# Patient Record
Sex: FEMALE | Race: WHITE | Employment: FULL TIME | URBAN - METROPOLITAN AREA
[De-identification: names, ages, dates, MRNs, and addresses within clinical notes are randomized per-mention and may not be internally consistent; named-entity substitution may affect disease eponyms.]

---

## 2021-05-03 ENCOUNTER — HOSPITAL ENCOUNTER (EMERGENCY)
Age: 41
Discharge: HOME | End: 2021-05-04
Attending: EMERGENCY MEDICINE
Payer: COMMERCIAL

## 2021-05-03 DIAGNOSIS — R21 RASH AND OTHER NONSPECIFIC SKIN ERUPTION: Primary | ICD-10-CM

## 2021-05-03 DIAGNOSIS — R50.9 INTERMITTENT FEVER OF UNKNOWN ORIGIN: ICD-10-CM

## 2021-05-03 PROCEDURE — 99283 EMERGENCY DEPT VISIT LOW MDM: CPT

## 2021-05-03 PROCEDURE — 96374 THER/PROPH/DIAG INJ IV PUSH: CPT

## 2021-05-04 VITALS
TEMPERATURE: 98.3 F | DIASTOLIC BLOOD PRESSURE: 58 MMHG | HEART RATE: 102 BPM | RESPIRATION RATE: 16 BRPM | SYSTOLIC BLOOD PRESSURE: 121 MMHG | OXYGEN SATURATION: 100 %

## 2021-05-04 LAB
A/G RATIO: 1.3 (ref 1.1–2.2)
ALBUMIN SERPL-MCNC: 3.8 G/DL (ref 3.4–5)
ALP BLD-CCNC: 71 U/L (ref 40–129)
ALT SERPL-CCNC: 18 U/L (ref 10–40)
ANION GAP SERPL CALCULATED.3IONS-SCNC: 10 MMOL/L (ref 3–16)
AST SERPL-CCNC: 17 U/L (ref 15–37)
BASOPHILS ABSOLUTE: 0.1 K/UL (ref 0–0.2)
BASOPHILS RELATIVE PERCENT: 0.7 %
BILIRUB SERPL-MCNC: 0.4 MG/DL (ref 0–1)
BILIRUBIN URINE: NEGATIVE
BLOOD, URINE: NEGATIVE
BUN BLDV-MCNC: 9 MG/DL (ref 7–20)
CALCIUM SERPL-MCNC: 9.1 MG/DL (ref 8.3–10.6)
CHLORIDE BLD-SCNC: 102 MMOL/L (ref 99–110)
CLARITY: CLEAR
CO2: 26 MMOL/L (ref 21–32)
COLOR: YELLOW
CREAT SERPL-MCNC: 0.6 MG/DL (ref 0.6–1.1)
EOSINOPHILS ABSOLUTE: 0.2 K/UL (ref 0–0.6)
EOSINOPHILS RELATIVE PERCENT: 2 %
GFR AFRICAN AMERICAN: >60
GFR NON-AFRICAN AMERICAN: >60
GLOBULIN: 2.9 G/DL
GLUCOSE BLD-MCNC: 100 MG/DL (ref 70–99)
GLUCOSE URINE: NEGATIVE MG/DL
HCG QUALITATIVE: NEGATIVE
HCT VFR BLD CALC: 37.6 % (ref 36–48)
HEMOGLOBIN: 12.8 G/DL (ref 12–16)
KETONES, URINE: NEGATIVE MG/DL
LEUKOCYTE ESTERASE, URINE: NEGATIVE
LYMPHOCYTES ABSOLUTE: 1.7 K/UL (ref 1–5.1)
LYMPHOCYTES RELATIVE PERCENT: 16.2 %
MCH RBC QN AUTO: 33.8 PG (ref 26–34)
MCHC RBC AUTO-ENTMCNC: 34.1 G/DL (ref 31–36)
MCV RBC AUTO: 99.3 FL (ref 80–100)
MICROSCOPIC EXAMINATION: NORMAL
MONOCYTES ABSOLUTE: 0.5 K/UL (ref 0–1.3)
MONOCYTES RELATIVE PERCENT: 4.6 %
NEUTROPHILS ABSOLUTE: 7.9 K/UL (ref 1.7–7.7)
NEUTROPHILS RELATIVE PERCENT: 76.5 %
NITRITE, URINE: NEGATIVE
PDW BLD-RTO: 13.4 % (ref 12.4–15.4)
PH UA: 6.5 (ref 5–8)
PLATELET # BLD: 362 K/UL (ref 135–450)
PMV BLD AUTO: 6.9 FL (ref 5–10.5)
POTASSIUM REFLEX MAGNESIUM: 3.7 MMOL/L (ref 3.5–5.1)
PROTEIN UA: NEGATIVE MG/DL
RAPID HIV 1&2: NORMAL
RBC # BLD: 3.78 M/UL (ref 4–5.2)
SARS-COV-2, NAAT: NOT DETECTED
SODIUM BLD-SCNC: 138 MMOL/L (ref 136–145)
SPECIFIC GRAVITY UA: <=1.005 (ref 1–1.03)
TOTAL PROTEIN: 6.7 G/DL (ref 6.4–8.2)
URINE REFLEX TO CULTURE: NORMAL
URINE TYPE: NORMAL
UROBILINOGEN, URINE: 0.2 E.U./DL
WBC # BLD: 10.3 K/UL (ref 4–11)

## 2021-05-04 PROCEDURE — 80053 COMPREHEN METABOLIC PANEL: CPT

## 2021-05-04 PROCEDURE — 84703 CHORIONIC GONADOTROPIN ASSAY: CPT

## 2021-05-04 PROCEDURE — 86701 HIV-1ANTIBODY: CPT

## 2021-05-04 PROCEDURE — 87635 SARS-COV-2 COVID-19 AMP PRB: CPT

## 2021-05-04 PROCEDURE — 6360000002 HC RX W HCPCS: Performed by: EMERGENCY MEDICINE

## 2021-05-04 PROCEDURE — 2580000003 HC RX 258: Performed by: EMERGENCY MEDICINE

## 2021-05-04 PROCEDURE — 85025 COMPLETE CBC W/AUTO DIFF WBC: CPT

## 2021-05-04 PROCEDURE — 81003 URINALYSIS AUTO W/O SCOPE: CPT

## 2021-05-04 RX ORDER — PREDNISONE 10 MG/1
40 TABLET ORAL DAILY
Qty: 20 TABLET | Refills: 0 | Status: SHIPPED | OUTPATIENT
Start: 2021-05-04 | End: 2021-05-09

## 2021-05-04 RX ORDER — 0.9 % SODIUM CHLORIDE 0.9 %
1000 INTRAVENOUS SOLUTION INTRAVENOUS ONCE
Status: COMPLETED | OUTPATIENT
Start: 2021-05-04 | End: 2021-05-04

## 2021-05-04 RX ORDER — METHYLPREDNISOLONE SODIUM SUCCINATE 125 MG/2ML
80 INJECTION, POWDER, LYOPHILIZED, FOR SOLUTION INTRAMUSCULAR; INTRAVENOUS ONCE
Status: COMPLETED | OUTPATIENT
Start: 2021-05-04 | End: 2021-05-04

## 2021-05-04 RX ADMIN — SODIUM CHLORIDE 1000 ML: 9 INJECTION, SOLUTION INTRAVENOUS at 03:02

## 2021-05-04 RX ADMIN — METHYLPREDNISOLONE SODIUM SUCCINATE 80 MG: 125 INJECTION, POWDER, FOR SOLUTION INTRAMUSCULAR; INTRAVENOUS at 03:02

## 2021-05-04 NOTE — ED TRIAGE NOTES
Pt. C/o fever ongoing everyday on and off since April 4th. Pt. States with fever comes rash. Temperature is 98.5.

## 2021-05-06 NOTE — ED PROVIDER NOTES
 Smokeless tobacco: Never Used   Substance and Sexual Activity    Alcohol use: No    Drug use: No    Sexual activity: Not Currently   Lifestyle    Physical activity     Days per week: Not on file     Minutes per session: Not on file    Stress: Not on file   Relationships    Social connections     Talks on phone: Not on file     Gets together: Not on file     Attends Shinto service: Not on file     Active member of club or organization: Not on file     Attends meetings of clubs or organizations: Not on file     Relationship status: Not on file    Intimate partner violence     Fear of current or ex partner: Not on file     Emotionally abused: Not on file     Physically abused: Not on file     Forced sexual activity: Not on file   Other Topics Concern    Not on file   Social History Narrative    Not on file     No current facility-administered medications for this encounter. Current Outpatient Medications   Medication Sig Dispense Refill    predniSONE (DELTASONE) 10 MG tablet Take 4 tablets by mouth daily for 5 days 20 tablet 0     No Known Allergies    REVIEW OF SYSTEMS  10 systems reviewed, pertinent positives per HPI otherwise noted to be negative. PHYSICAL EXAM  BP (!) 121/58   Pulse 102   Temp 98.3 °F (36.8 °C) (Oral)   Resp 16   LMP 04/19/2021 (Approximate)   SpO2 100%   Breastfeeding Unknown    Physical exam:  General appearance: awake and cooperative. No distress. Non toxic appearing. Skin: Warm and dry. There are erythematous regions of skin, no central clearing, no induration or streaking to neck, chest, back, and extremities; palms, soles and face are spared. No urticaria. HENT: Normocephalic. Atraumatic. Mucus membranes are moist.  No oral lesions. No oropharyngeal edema. No erythema or exudate  Neck: supple  Eyes: KIRSTY. EOM intact. Heart: RRR. No murmurs. Lungs: Respirations unlabored. CTAB. No wheezes, rales, or rhonchi. Good air exchange  Abdomen: No tenderness. Soft. Non distended. No peritoneal signs. Musculoskeletal: No extremity edema. Compartments soft. No deformity. No tenderness in the extremities. All extremities neurovascularly intact. Radial, Dp, and PT pulses +2/4 bilaterally  Neurological: Alert and oriented. No focal deficits. No aphasia or dysarthria. No gait ataxia. Psychiatric: Normal mood and affect. LABS  I have reviewed all labs for this visit.    Results for orders placed or performed during the hospital encounter of 05/03/21   COVID-19, Rapid   Result Value Ref Range    SARS-CoV-2, NAAT Not Detected Not Detected   Urinalysis Reflex to Culture    Specimen: Urine, clean catch   Result Value Ref Range    Color, UA Yellow Straw/Yellow    Clarity, UA Clear Clear    Glucose, Ur Negative Negative mg/dL    Bilirubin Urine Negative Negative    Ketones, Urine Negative Negative mg/dL    Specific Gravity, UA <=1.005 1.005 - 1.030    Blood, Urine Negative Negative    pH, UA 6.5 5.0 - 8.0    Protein, UA Negative Negative mg/dL    Urobilinogen, Urine 0.2 <2.0 E.U./dL    Nitrite, Urine Negative Negative    Leukocyte Esterase, Urine Negative Negative    Microscopic Examination Not Indicated     Urine Type NotGiven     Urine Reflex to Culture Not Indicated    CBC Auto Differential   Result Value Ref Range    WBC 10.3 4.0 - 11.0 K/uL    RBC 3.78 (L) 4.00 - 5.20 M/uL    Hemoglobin 12.8 12.0 - 16.0 g/dL    Hematocrit 37.6 36.0 - 48.0 %    MCV 99.3 80.0 - 100.0 fL    MCH 33.8 26.0 - 34.0 pg    MCHC 34.1 31.0 - 36.0 g/dL    RDW 13.4 12.4 - 15.4 %    Platelets 247 891 - 229 K/uL    MPV 6.9 5.0 - 10.5 fL    Neutrophils % 76.5 %    Lymphocytes % 16.2 %    Monocytes % 4.6 %    Eosinophils % 2.0 %    Basophils % 0.7 %    Neutrophils Absolute 7.9 (H) 1.7 - 7.7 K/uL    Lymphocytes Absolute 1.7 1.0 - 5.1 K/uL    Monocytes Absolute 0.5 0.0 - 1.3 K/uL    Eosinophils Absolute 0.2 0.0 - 0.6 K/uL    Basophils Absolute 0.1 0.0 - 0.2 K/uL   Comprehensive Metabolic Panel w/ Reflex Patient is given dose of Solu-Medrol here and fluids, she describes improvement in the rash. She is given a short course of prednisone for home. The etiology of the rash is not entirely clear but I do not suspect SJS, TN, or erythema to form. There is no evidence that this is allergic in nature, no sign of anaphylaxis. I do not suspect toxic shock syndrome or staphylococcal scalded skin syndrome. Overall the patient is well-appearing and I can follow-up outpatient. She is given referrals for primary care, dermatology, and infectious disease since she reports intermittent fevers. We did discuss strict return precautions at length and she voices understanding. During the patient's ED course, the patient was given:  Medications   0.9 % sodium chloride bolus (0 mLs Intravenous Stopped 5/4/21 0402)   methylPREDNISolone sodium (SOLU-MEDROL) injection 80 mg (80 mg Intravenous Given 5/4/21 0302)        CLINICAL IMPRESSION  1. Rash and other nonspecific skin eruption    2. Intermittent fever of unknown origin        Blood pressure (!) 121/58, pulse 102, temperature 98.3 °F (36.8 °C), temperature source Oral, resp. rate 16, last menstrual period 04/19/2021, SpO2 100 %, unknown if currently breastfeeding. Patient was given scripts for the following medications. I counseled patient how to take these medications. Discharge Medication List as of 5/4/2021  4:16 AM      START taking these medications    Details   predniSONE (DELTASONE) 10 MG tablet Take 4 tablets by mouth daily for 5 days, Disp-20 tablet, R-0Print             Follow-up with:  Wu Simons  [phone]          DISCLAIMER: This chart was created using Dragon dictation software. Efforts were made by me to ensure accuracy, however some errors may be present due to limitations of this technology and occasionally words are not transcribed correctly.        Cruz Moreau, [address] [place] [address]  05/05/21 0354

## 2021-05-25 ENCOUNTER — OFFICE VISIT (OUTPATIENT)
Dept: INFECTIOUS DISEASES | Age: 41
End: 2021-05-25
Payer: COMMERCIAL

## 2021-05-25 VITALS
SYSTOLIC BLOOD PRESSURE: 138 MMHG | BODY MASS INDEX: 26.82 KG/M2 | HEIGHT: 65 IN | TEMPERATURE: 98.4 F | DIASTOLIC BLOOD PRESSURE: 88 MMHG | WEIGHT: 161 LBS

## 2021-05-25 DIAGNOSIS — R50.9 FUO (FEVER OF UNKNOWN ORIGIN): Primary | ICD-10-CM

## 2021-05-25 PROCEDURE — 99243 OFF/OP CNSLTJ NEW/EST LOW 30: CPT | Performed by: INTERNAL MEDICINE

## 2021-05-25 NOTE — PROGRESS NOTES
Infectious Diseases   Consult Note      Reason for Consult: rash, fever    Requesting Physician:       Subjective:   CHIEF COMPLAINT:  None given       HPI:    Ethan Wagoner is a 47yoF with limited medical history                 Day after Easter, began feeling weak, high grade daily usually in the morning, myalgia  No arthralgia   Anorexia. Maybe 4# down  \"Mild\" sore throat for ~2 weeks     Rash flat, large patches of erythema, not pruritic or painful  Rash most prominent /intensely red with fever, tend to resolve with fever resolved     ED 5/3/21 -   COVID NAAT neg   UA neg nitrite/LE  WBC 10.3 with very slight neutrophilia   CMP nl   HIV screen neg     Rx then solumedrol with prescription for prednisone   Since then, feeling well. Current abx:  None    Only regular med is OTC claritin        Past Surgical History:   No past medical history on file. No past surgical history on file. Social History:    TOBACCO:   reports that she has been smoking cigarettes. She has been smoking about 0.50 packs per day. She has never used smokeless tobacco.  ETOH:   reports no history of alcohol use. There is no history of illicit drug use or other significant epidemiologic exposures. Born [address] and lived locally  Travel to [place] in [address]  No international travel   Works at [employer] w sister and  and 2 kids and 4 dogs, [place]   [address]   Not sexually active       Family History:   DM  Dad PCV , ? Agent orange related   Mom and aunt have RA       No Known Allergies       REVIEW OF SYSTEMS:    CONSTITUTIONAL:  negative for fevers, chills, diaphoresis, activity change, appetite change, fatigue, night sweats and unexpected weight change.    EYES:  negative for blurred vision, eye discharge, visual disturbance and icterus  HEENT:  negative for hearing loss, tinnitus, ear drainage, sinus pressure, nasal congestion, epistaxis and snoring  RESPIRATORY:  No cough, shortness of breath, hemoptysis CARDIOVASCULAR:  negative for chest pain, palpitations, exertional chest pressure/discomfort, edema, syncope  GASTROINTESTINAL:  negative for nausea, vomiting, diarrhea, constipation, blood in stool and abdominal pain  GENITOURINARY:   Nl menses, no  symptoms  HEMATOLOGIC/LYMPHATIC:  negative for easy bruising, bleeding and lymphadenopathy  ALLERGIC/IMMUNOLOGIC:  negative for recurrent infections, angioedema, anaphylaxis and drug reactions  ENDOCRINE:  negative for weight changes and diabetic symptoms including polyuria, polydipsia and polyphagia  MUSCULOSKELETAL:   Hands cramp   NEUROLOGICAL:  negative for headaches, slurred speech, unilateral weakness  PSYCHIATRIC/BEHAVIORAL: negative for hallucinations, behavioral problems, confusion and agitation. Objective:   PHYSICAL EXAM:      VITALS:  There were no vitals taken for this visit. 24HR INTAKE/OUTPUT:  No intake or output data in the 24 hours ending 05/25/21 1144  CONSTITUTIONAL:  Awake, alert, cooperative, no apparent distress, and appears stated age  [de-identified]: NCAT, PERRL, EOMI. Sclera white, conjunctiva full. OP with moist mucosal membranes, no thrush, tongue protrudes midline  NECK:  Supple, symmetrical, trachea midline, no adenopathy  LUNGS:  no increased work of breathing, CTA angie   CARDIOVASCULAR:  RRR without murmur   ABDOMEN:  normal bowel sounds, non-tender, non-distended  PSYCHIATRIC: Oriented to person place and time. No obvious depression or anxiety. MUSCULOSKELETAL: No obvious misalignment or effusion of the joints. No clubbing, cyanosis of the digits. SKIN:  normal skin color, texture, turgor and no redness, warmth, or swelling.  No palpable nodules or stigmata of embolic phenomenon  NEUROLOGIC: nonfocal exam        DATA:    Old records have been reviewed      Assessment:       Wesly Veras is a 47yoF who is evaluated for the following:    Febrile illness  Migratory macular rash, some with central pallor  Arthralgia   Seems to be steroid responsive syndrome     NKDA       Ddx broad  Inflammatory etiology favored over infectious given response to steroid  She has strong FH of autoimmune disease    Check the following  ASO   RF   ESR, CRP   ALVIN  CK   Lyme     Phone follow-up with those results and if symptoms recur  All questions answered        Sameer Leonard M.D. Thank you for the opportunity to participate in the care of your patient.     Please do not hesitate to contact me:   [phone] office

## 2024-09-24 ENCOUNTER — HOSPITAL ENCOUNTER (INPATIENT)
Age: 44
LOS: 1 days | Discharge: HOME | DRG: 951 | End: 2024-09-25
Attending: EMERGENCY MEDICINE | Admitting: INTERNAL MEDICINE
Payer: COMMERCIAL

## 2024-09-24 ENCOUNTER — APPOINTMENT (OUTPATIENT)
Dept: ULTRASOUND IMAGING | Age: 44
DRG: 951 | End: 2024-09-24
Payer: COMMERCIAL

## 2024-09-24 DIAGNOSIS — R55 SYNCOPE AND COLLAPSE: ICD-10-CM

## 2024-09-24 DIAGNOSIS — O03.9 MISCARRIAGE: Primary | ICD-10-CM

## 2024-09-24 DIAGNOSIS — R82.90 ABNORMAL URINALYSIS: ICD-10-CM

## 2024-09-24 PROBLEM — N93.9 VAGINAL BLEEDING: Status: ACTIVE | Noted: 2024-09-24

## 2024-09-24 LAB
ABO + RH BLD: NORMAL
ALBUMIN SERPL-MCNC: 3.8 G/DL (ref 3.4–5)
ALBUMIN/GLOB SERPL: 1.7 {RATIO} (ref 1.1–2.2)
ALP SERPL-CCNC: 60 U/L (ref 40–129)
ALT SERPL-CCNC: 8 U/L (ref 10–40)
ANION GAP SERPL CALCULATED.3IONS-SCNC: 13 MMOL/L (ref 3–16)
AST SERPL-CCNC: 13 U/L (ref 15–37)
B-HCG SERPL EIA 3RD IS-ACNC: 1106 MIU/ML
BASOPHILS # BLD: 0.1 K/UL (ref 0–0.2)
BASOPHILS NFR BLD: 0.4 %
BILIRUB SERPL-MCNC: 0.3 MG/DL (ref 0–1)
BILIRUB UR QL STRIP.AUTO: NEGATIVE
BUN SERPL-MCNC: 14 MG/DL (ref 7–20)
CALCIUM SERPL-MCNC: 8.6 MG/DL (ref 8.3–10.6)
CHLORIDE SERPL-SCNC: 102 MMOL/L (ref 99–110)
CLARITY UR: ABNORMAL
CO2 SERPL-SCNC: 22 MMOL/L (ref 21–32)
COLOR UR: ABNORMAL
CREAT SERPL-MCNC: 0.7 MG/DL (ref 0.6–1.1)
DEPRECATED RDW RBC AUTO: 13 % (ref 12.4–15.4)
EOSINOPHIL # BLD: 0.1 K/UL (ref 0–0.6)
EOSINOPHIL NFR BLD: 0.4 %
GFR SERPLBLD CREATININE-BSD FMLA CKD-EPI: >90 ML/MIN/{1.73_M2}
GLUCOSE SERPL-MCNC: 154 MG/DL (ref 70–99)
GLUCOSE UR STRIP.AUTO-MCNC: NEGATIVE MG/DL
HCG SERPL QL: POSITIVE
HCT VFR BLD AUTO: 32.6 % (ref 36–48)
HCT VFR BLD AUTO: 33.3 % (ref 36–48)
HCT VFR BLD AUTO: 36.1 % (ref 36–48)
HGB BLD-MCNC: 10.7 G/DL (ref 12–16)
HGB BLD-MCNC: 11 G/DL (ref 12–16)
HGB BLD-MCNC: 12.1 G/DL (ref 12–16)
HGB UR QL STRIP.AUTO: ABNORMAL
KETONES UR STRIP.AUTO-MCNC: 15 MG/DL
LACTATE BLDV-SCNC: 1.2 MMOL/L (ref 0.4–2)
LEUKOCYTE ESTERASE UR QL STRIP.AUTO: ABNORMAL
LYMPHOCYTES # BLD: 2.4 K/UL (ref 1–5.1)
LYMPHOCYTES NFR BLD: 13.8 %
MCH RBC QN AUTO: 33.7 PG (ref 26–34)
MCHC RBC AUTO-ENTMCNC: 33.5 G/DL (ref 31–36)
MCV RBC AUTO: 100.4 FL (ref 80–100)
MONOCYTES # BLD: 0.8 K/UL (ref 0–1.3)
MONOCYTES NFR BLD: 4.4 %
NEUTROPHILS # BLD: 14.3 K/UL (ref 1.7–7.7)
NEUTROPHILS NFR BLD: 81 %
NITRITE UR QL STRIP.AUTO: POSITIVE
PH UR STRIP.AUTO: 5.5 [PH] (ref 5–8)
PLATELET # BLD AUTO: 336 K/UL (ref 135–450)
PMV BLD AUTO: 7.3 FL (ref 5–10.5)
POTASSIUM SERPL-SCNC: 3.7 MMOL/L (ref 3.5–5.1)
PROCALCITONIN SERPL IA-MCNC: 0.04 NG/ML (ref 0–0.15)
PROT SERPL-MCNC: 6 G/DL (ref 6.4–8.2)
PROT UR STRIP.AUTO-MCNC: 100 MG/DL
RBC # BLD AUTO: 3.6 M/UL (ref 4–5.2)
RBC #/AREA URNS HPF: >100 /HPF (ref 0–4)
RHIG LOT NUMBER: NORMAL
SODIUM SERPL-SCNC: 137 MMOL/L (ref 136–145)
SP GR UR STRIP.AUTO: 1.02 (ref 1–1.03)
UA COMPLETE W REFLEX CULTURE PNL UR: ABNORMAL
UA DIPSTICK W REFLEX MICRO PNL UR: YES
URN SPEC COLLECT METH UR: ABNORMAL
UROBILINOGEN UR STRIP-ACNC: 1 E.U./DL
WBC # BLD AUTO: 17.7 K/UL (ref 4–11)
WBC #/AREA URNS HPF: ABNORMAL /HPF (ref 0–5)

## 2024-09-24 PROCEDURE — 84145 PROCALCITONIN (PCT): CPT

## 2024-09-24 PROCEDURE — 6360000002 HC RX W HCPCS: Performed by: EMERGENCY MEDICINE

## 2024-09-24 PROCEDURE — 85014 HEMATOCRIT: CPT

## 2024-09-24 PROCEDURE — 36415 COLL VENOUS BLD VENIPUNCTURE: CPT

## 2024-09-24 PROCEDURE — 1200000000 HC SEMI PRIVATE

## 2024-09-24 PROCEDURE — 85018 HEMOGLOBIN: CPT

## 2024-09-24 PROCEDURE — 99285 EMERGENCY DEPT VISIT HI MDM: CPT

## 2024-09-24 PROCEDURE — 6370000000 HC RX 637 (ALT 250 FOR IP): Performed by: INTERNAL MEDICINE

## 2024-09-24 PROCEDURE — 83605 ASSAY OF LACTIC ACID: CPT

## 2024-09-24 PROCEDURE — 84702 CHORIONIC GONADOTROPIN TEST: CPT

## 2024-09-24 PROCEDURE — 81001 URINALYSIS AUTO W/SCOPE: CPT

## 2024-09-24 PROCEDURE — 86901 BLOOD TYPING SEROLOGIC RH(D): CPT

## 2024-09-24 PROCEDURE — 96372 THER/PROPH/DIAG INJ SC/IM: CPT

## 2024-09-24 PROCEDURE — 85025 COMPLETE CBC W/AUTO DIFF WBC: CPT

## 2024-09-24 PROCEDURE — 84703 CHORIONIC GONADOTROPIN ASSAY: CPT

## 2024-09-24 PROCEDURE — 76857 US EXAM PELVIC LIMITED: CPT

## 2024-09-24 PROCEDURE — 80053 COMPREHEN METABOLIC PANEL: CPT

## 2024-09-24 PROCEDURE — 2580000003 HC RX 258: Performed by: INTERNAL MEDICINE

## 2024-09-24 PROCEDURE — 93005 ELECTROCARDIOGRAM TRACING: CPT | Performed by: INTERNAL MEDICINE

## 2024-09-24 PROCEDURE — 86900 BLOOD TYPING SEROLOGIC ABO: CPT

## 2024-09-24 RX ORDER — SODIUM CHLORIDE 9 MG/ML
INJECTION, SOLUTION INTRAVENOUS PRN
Status: DISCONTINUED | OUTPATIENT
Start: 2024-09-24 | End: 2024-09-25 | Stop reason: HOSPADM

## 2024-09-24 RX ORDER — CEFUROXIME AXETIL 500 MG/1
500 TABLET ORAL 2 TIMES DAILY
Qty: 14 TABLET | Refills: 0 | Status: SHIPPED | OUTPATIENT
Start: 2024-09-24 | End: 2024-10-01

## 2024-09-24 RX ORDER — SODIUM CHLORIDE 9 MG/ML
INJECTION, SOLUTION INTRAVENOUS CONTINUOUS
Status: ACTIVE | OUTPATIENT
Start: 2024-09-24 | End: 2024-09-25

## 2024-09-24 RX ORDER — LANOLIN ALCOHOL/MO/W.PET/CERES
3 CREAM (GRAM) TOPICAL NIGHTLY PRN
Status: DISCONTINUED | OUTPATIENT
Start: 2024-09-24 | End: 2024-09-25 | Stop reason: HOSPADM

## 2024-09-24 RX ORDER — ONDANSETRON 2 MG/ML
4 INJECTION INTRAMUSCULAR; INTRAVENOUS EVERY 6 HOURS PRN
Status: DISCONTINUED | OUTPATIENT
Start: 2024-09-24 | End: 2024-09-25 | Stop reason: HOSPADM

## 2024-09-24 RX ORDER — PROMETHAZINE HYDROCHLORIDE 25 MG/1
12.5 TABLET ORAL EVERY 6 HOURS PRN
Status: DISCONTINUED | OUTPATIENT
Start: 2024-09-24 | End: 2024-09-25 | Stop reason: HOSPADM

## 2024-09-24 RX ORDER — SODIUM CHLORIDE 0.9 % (FLUSH) 0.9 %
10 SYRINGE (ML) INJECTION PRN
Status: DISCONTINUED | OUTPATIENT
Start: 2024-09-24 | End: 2024-09-25 | Stop reason: HOSPADM

## 2024-09-24 RX ORDER — SODIUM CHLORIDE 0.9 % (FLUSH) 0.9 %
10 SYRINGE (ML) INJECTION EVERY 12 HOURS SCHEDULED
Status: DISCONTINUED | OUTPATIENT
Start: 2024-09-24 | End: 2024-09-25 | Stop reason: HOSPADM

## 2024-09-24 RX ORDER — ACETAMINOPHEN 500 MG
500 TABLET ORAL EVERY 6 HOURS PRN
Qty: 30 TABLET | Refills: 0 | Status: SHIPPED | OUTPATIENT
Start: 2024-09-24

## 2024-09-24 RX ORDER — ACETAMINOPHEN 325 MG/1
650 TABLET ORAL EVERY 6 HOURS PRN
Status: DISCONTINUED | OUTPATIENT
Start: 2024-09-24 | End: 2024-09-25 | Stop reason: HOSPADM

## 2024-09-24 RX ORDER — ACETAMINOPHEN 650 MG/1
650 SUPPOSITORY RECTAL EVERY 6 HOURS PRN
Status: DISCONTINUED | OUTPATIENT
Start: 2024-09-24 | End: 2024-09-25 | Stop reason: HOSPADM

## 2024-09-24 RX ADMIN — Medication 10 ML: at 22:48

## 2024-09-24 RX ADMIN — SODIUM CHLORIDE: 9 INJECTION, SOLUTION INTRAVENOUS at 22:58

## 2024-09-24 RX ADMIN — HUMAN RHO(D) IMMUNE GLOBULIN 300 MCG: 300 INJECTION, SOLUTION INTRAMUSCULAR at 17:58

## 2024-09-24 RX ADMIN — ACETAMINOPHEN 650 MG: 325 TABLET ORAL at 22:46

## 2024-09-24 ASSESSMENT — ENCOUNTER SYMPTOMS
ABDOMINAL PAIN: 0
EYE REDNESS: 0
SORE THROAT: 0
SHORTNESS OF BREATH: 0
RHINORRHEA: 0

## 2024-09-24 ASSESSMENT — PAIN DESCRIPTION - LOCATION: LOCATION: ABDOMEN

## 2024-09-24 ASSESSMENT — PAIN - FUNCTIONAL ASSESSMENT: PAIN_FUNCTIONAL_ASSESSMENT: 0-10

## 2024-09-24 ASSESSMENT — PAIN DESCRIPTION - DESCRIPTORS: DESCRIPTORS: CRAMPING

## 2024-09-24 ASSESSMENT — PAIN SCALES - GENERAL: PAINLEVEL_OUTOF10: 0

## 2024-09-24 NOTE — ED NOTES
Score: 15                CIWA Assessment  BP: (!) 106/59  Pulse: 78             TREATMENT:  During the patient's ED course, the patient was given:  Medications   Rho(D) immune globulin (HYPERRHO;RHOGAM;RHOPHYLAC) injection 300 mcg (300 mcg IntraMUSCular Given 9/24/24 1758)        Patient was given scripts for the following medications. I counseled patient how to take these medications.   New Prescriptions    ACETAMINOPHEN (TYLENOL) 500 MG TABLET    Take 1 tablet by mouth every 6 hours as needed for Pain    CEFUROXIME (CEFTIN) 500 MG TABLET    Take 1 tablet by mouth 2 times daily for 7 days       Vitals:    Vitals:    09/24/24 1807 09/24/24 1841 09/24/24 1906 09/24/24 1936   BP: 98/65 99/62 (!) 86/53 (!) 106/59   Pulse: 96 80 80 78   Resp: 23 15 21 16   Temp:       TempSrc:       SpO2: 99% 98% 95% 100%   Weight:           FINAL IMPRESSION      1. Miscarriage    2. Abnormal urinalysis    3. Syncope and collapse          DISPOSITION/PLAN   Disposition: DISPOSITION Admitted 09/24/2024 08:21:36 PM  Condition at Disposition: Data Unavailable    Condition: stable     DISCHARGE MEDICATIONS:  Patient was given scripts for the following medications. I counseled patient how to take these medications:  New Prescriptions    ACETAMINOPHEN (TYLENOL) 500 MG TABLET    Take 1 tablet by mouth every 6 hours as needed for Pain    CEFUROXIME (CEFTIN) 500 MG TABLET    Take 1 tablet by mouth 2 times daily for 7 days       DISCONTINUED MEDICATIONS:  Discontinued Medications    No medications on file       FOLLOW UP:  Your OB  1 week          DISCLAIMER: This chart was created using Dragon dictation software.  Efforts were made by me to ensure accuracy, however some errors may be present due to limitations of this technology and occasionally words are not transcribed correctly.

## 2024-09-24 NOTE — ED PROVIDER NOTES
[facility]  ED     EMERGENCY DEPARTMENT ENCOUNTER            Pt Name: Ana Mark   MRN: [id number]   Birthdate 1980   Date of evaluation: 9/24/2024   Provider: Kyle Batista MD   PCP: No primary care provider on file.   Note Started: 4:39 PM EDT 9/24/24          CHIEF COMPLAINT     Chief Complaint   Patient presents with    Miscarriage     Pt sent by her OBGYN via 911 for vaginal bleeding and syncope. EMS reporting large amounts of blood loss. Pt believes she's approximately eight weeks pregnant. States she's never been pregnant before. Says she didn't think she could get pregnant.              HISTORY OF PRESENT ILLNESS:   History from : Patient   Limitations to history : None     Ana Mark is a 44 y.o. female who presents with vaginal bleeding.  This patient states she has never been pregnant before.  She states her last menstrual cycle was early July.  She states she has been seen by Dr. Huynh.  She states she had an ultrasound is not sure what was found.  She states today around 2:00 PM she started bleeding.  She went to see her OB/GYN and passed out at the office and was was sent here for further evaluation    I called the patient's obstetrician.  I was able to speak with him.  He states that he performed an ultrasound a few weeks ago which showed evidence of an intrauterine pregnancy.  He states he saw an intrauterine fetal pole with an abnormal yolk sac.  He does not report any fetal cardiac cavity.  He states the patient started bleeding around 2:00 PM today.  She went into see him.  He did her exam and she had a closed os.  He states that when she first arrived she was heavily bleeding any estimated or estimated blood loss to be 500 mL.  He was finishing up the exam when she passed out.  Her blood pressure was soft at 90/60 and she was mildly tachycardic.  He recommended that we perform an ultrasound to look for retained products of  the office.  At this time I feel the syncopal event was vasovagal.  The patient has not been tachycardic and she has not been hypotensive in her ED.  CBC shows a normal H&H.  The patient's urinalysis shows nitrite positive urine.  Her microscopic is pending.  hCG is 1106.  She is Rh-.  She has a mild leukocytosis with a left shift which could be stress related.  She is afebrile here.  CMP shows a glucose of 154.    Assuming that the Urinalysis does not show any obvious infection she would be discharged after RhoGAM is administered.    The patient was administered RhoGAM and the urinalysis did not show any obvious infection.  The patient complaining of continued bleeding at the time of discharge repeat hemoglobin hematocrit was send the hemoglobin hematocrit have decreased to 11 and 33.3.  While waiting for that blood test to come back the patient went to the bathroom and had another presyncopal/syncopal event while sitting on the toilet.  Her  is present she did not fall or hit the ground.  As such I spoke to the obstetrician with [facility].  They ask if the hospitalist will be amenable to admitting this patient.  I then spoke to the hospitalist and the hospitalist who admit the patient for syncope, anemia and vaginal bleeding.       Patient was given the following medications:   Medications   Rho(D) immune globulin (HYPERRHO;RHOGAM;RHOPHYLAC) injection 300 mcg (300 mcg IntraMUSCular Given 9/24/24 1758)        CONSULTS: (Who and What was discussed)  IP CONSULT TO OB GYN    Discussion with Other Profesionals : Admitting Team and OB/GYN    Social Determinants : None    Records Reviewed : None    Chronic Conditions:   has no past medical history on file.        Disposition Considerations:    I am the Primary Clinician of Record.        FINAL IMPRESSION    1. Miscarriage    2. Abnormal urinalysis    3. Syncope and collapse           DISPOSITION/PLAN     PATIENT REFERRED TO:   Your OB  1 week

## 2024-09-24 NOTE — ED NOTES
Placed call to [facility] OB/GYN @ 1914 per MD Lisa Batista MD Returned call @ 1920

## 2024-09-25 VITALS
TEMPERATURE: 97.6 F | BODY MASS INDEX: 27.21 KG/M2 | HEART RATE: 91 BPM | RESPIRATION RATE: 18 BRPM | OXYGEN SATURATION: 99 % | SYSTOLIC BLOOD PRESSURE: 118 MMHG | DIASTOLIC BLOOD PRESSURE: 79 MMHG | WEIGHT: 161 LBS

## 2024-09-25 LAB
ALBUMIN SERPL-MCNC: 3.7 G/DL (ref 3.4–5)
ALBUMIN/GLOB SERPL: 1.9 {RATIO} (ref 1.1–2.2)
ALP SERPL-CCNC: 51 U/L (ref 40–129)
ALT SERPL-CCNC: 8 U/L (ref 10–40)
ANION GAP SERPL CALCULATED.3IONS-SCNC: 6 MMOL/L (ref 3–16)
APTT BLD: 25.6 SEC (ref 22.1–36.4)
AST SERPL-CCNC: 12 U/L (ref 15–37)
BILIRUB SERPL-MCNC: 0.5 MG/DL (ref 0–1)
BUN SERPL-MCNC: 10 MG/DL (ref 7–20)
CALCIUM SERPL-MCNC: 8.3 MG/DL (ref 8.3–10.6)
CHLORIDE SERPL-SCNC: 106 MMOL/L (ref 99–110)
CO2 SERPL-SCNC: 26 MMOL/L (ref 21–32)
CREAT SERPL-MCNC: <0.5 MG/DL (ref 0.6–1.1)
EKG ATRIAL RATE: 93 BPM
EKG DIAGNOSIS: NORMAL
EKG P AXIS: 57 DEGREES
EKG P-R INTERVAL: 112 MS
EKG Q-T INTERVAL: 358 MS
EKG QRS DURATION: 84 MS
EKG QTC CALCULATION (BAZETT): 445 MS
EKG R AXIS: 32 DEGREES
EKG T AXIS: 48 DEGREES
EKG VENTRICULAR RATE: 93 BPM
FOLATE SERPL-MCNC: 15.6 NG/ML (ref 4.78–24.2)
GFR SERPLBLD CREATININE-BSD FMLA CKD-EPI: >90 ML/MIN/{1.73_M2}
GLUCOSE SERPL-MCNC: 119 MG/DL (ref 70–99)
HCT VFR BLD AUTO: 29.2 % (ref 36–48)
HCT VFR BLD AUTO: 29.4 % (ref 36–48)
HCT VFR BLD AUTO: 30 % (ref 36–48)
HGB BLD-MCNC: 10 G/DL (ref 12–16)
HGB BLD-MCNC: 9.6 G/DL (ref 12–16)
HGB BLD-MCNC: 9.7 G/DL (ref 12–16)
INR PPP: 1.09 (ref 0.85–1.15)
POTASSIUM SERPL-SCNC: 4.9 MMOL/L (ref 3.5–5.1)
PROT SERPL-MCNC: 5.6 G/DL (ref 6.4–8.2)
PROTHROMBIN TIME: 14.4 SEC (ref 11.9–14.9)
SODIUM SERPL-SCNC: 138 MMOL/L (ref 136–145)
VIT B12 SERPL-MCNC: 435 PG/ML (ref 211–911)

## 2024-09-25 PROCEDURE — 80053 COMPREHEN METABOLIC PANEL: CPT

## 2024-09-25 PROCEDURE — 82746 ASSAY OF FOLIC ACID SERUM: CPT

## 2024-09-25 PROCEDURE — 2580000003 HC RX 258: Performed by: INTERNAL MEDICINE

## 2024-09-25 PROCEDURE — 85610 PROTHROMBIN TIME: CPT

## 2024-09-25 PROCEDURE — 36415 COLL VENOUS BLD VENIPUNCTURE: CPT

## 2024-09-25 PROCEDURE — 85014 HEMATOCRIT: CPT

## 2024-09-25 PROCEDURE — 85730 THROMBOPLASTIN TIME PARTIAL: CPT

## 2024-09-25 PROCEDURE — 93010 ELECTROCARDIOGRAM REPORT: CPT | Performed by: INTERNAL MEDICINE

## 2024-09-25 PROCEDURE — 82607 VITAMIN B-12: CPT

## 2024-09-25 PROCEDURE — 85018 HEMOGLOBIN: CPT

## 2024-09-25 RX ADMIN — SODIUM CHLORIDE: 9 INJECTION, SOLUTION INTRAVENOUS at 08:35

## 2024-09-25 NOTE — CARE COORDINATION
Advance Care Planning     General Advance Care Planning (ACP) Conversation    Date of Conversation: 9/25/2024  Conducted with: Patient with Decision Making Capacity  Other persons present: Significant other Jose Luis    Healthcare Decision Maker:   Primary Decision Maker: Jose Luis Triplett - Domestic Partner - [phone]       Content/Action Overview:  Has NO ACP documents-Information provided  Reviewed DNR/DNI and patient elects Full Code (Attempt Resuscitation)    Length of Voluntary ACP Conversation in minutes:  <16 minutes (Non-Billable)    Saritha Andersen RN

## 2024-09-25 NOTE — PLAN OF CARE
Problem: Safety - Adult  Goal: Free from fall injury  Outcome: Progressing  Flowsheets (Taken 9/25/2024 1154)  Free From Fall Injury: Instruct family/caregiver on patient safety

## 2024-09-25 NOTE — DISCHARGE SUMMARY
Hospital Medicine Progress Note      Date of Admission: 2024  Hospital Day: 2    Chief Admission Complaint:  ***     Subjective:  ***    Presenting Admission History:       44 y.o. female  who presented to [facility] presented to the ED with chief complaint of vaginal bleeding and syncope     Patient reported she has had a positive pregnancy test back in August reported that she has never been pregnant before.  Patient reported that she works at [employer] has been having a lot of exertional work at home reported that she also had a ultrasound done previously on few weeks ago that showed a intrauterine pregnancy that was normal.  Patient reported that she started having some vaginal bleeding around 2 PM progressive also has been having multiple times having syncope when she stands up or positional.  No associate with fever chills nausea vomiting abdominal pain shortness of breath chest pain or dysuria.    Assessment/Plan:      Acute vaginal bleed secondary to miscarriage  OB/GYN was consulted in ED requested medicine to admit  H&H every 6 hours  Patient is Rh- thus was given RhoGAM given her partner is Rh positive  OB/GYN consulted, appreciated     Syncope:  EKG normal sinus rhythm  Orthostatics pending  Continue to monitor     Acute blood loss anemia secondary to vaginal bleed     Macrocytosis: B12 folate    Discussed the discharge plan in detail with case management including timing/barriers to discharge, need for support services and/or placement decision.      Telemetry/Rhythm Strip (interpreted by me):  ***      [] High MDM (any 2)    A. Problems (any 1)  [] Acute/Chronic Illness/injury posing threat to life or bodily function  [] Severe exacerbation of chronic illness   ---------------------------------------------------------------------  B. Risk of Treatment (any 1)   [] Drugs/treatments that require intensive monitoring for toxicity include:    [] IV ABX requiring serial renal monitoring for

## 2024-09-25 NOTE — CARE COORDINATION
Case Management Assessment  Initial Evaluation    Date/Time of Evaluation: 9/25/2024 12:16 PM  Assessment Completed by: Saritha Andersen RN    If patient is discharged prior to next notation, then this note serves as note for discharge by case management.    Patient Name: Ana Mark                   YOB: 1980  Diagnosis: Syncope and collapse [R55]  Miscarriage [O03.9]  Vaginal bleeding [N93.9]  Abnormal urinalysis [R82.90]                   Date / Time: 9/24/2024  3:30 PM    Patient Admission Status: Inpatient   Readmission Risk (Low < 19, Mod (19-27), High > 27): Readmission Risk Score: 8.3    Current PCP: No primary care provider on file.  PCP verified by ? No (has no PCP given provider list.)    Chart Reviewed: Yes      History Provided by: Patient  Patient Orientation: Alert and Oriented, Person, Place, Situation, Self    Patient Cognition: Alert    Hospitalization in the last 30 days (Readmission):  No    If yes, Readmission Assessment in  Navigator will be completed.    Advance Directives:      Code Status: Full Code   Patient's Primary Decision Maker is: Legal Next of Kin    Primary Decision Maker: UzielJose Luis - Domestic Partner - [phone]    Discharge Planning:    Patient lives with: Family Members Type of Home: House  Primary Care Giver: Self  Patient Support Systems include: Spouse/Significant Other   Current Financial resources: Other (Comment) (BCBS)  Current community resources: None  Current services prior to admission: None            Current DME:              Type of Home Care services:  None    ADLS  Prior functional level: Independent in ADLs/IADLs  Current functional level: Independent in ADLs/IADLs    PT AM-PAC:   /24  OT AM-PAC:   /24    Family can provide assistance at DC: Yes  Would you like Case Management to discuss the discharge plan with any other family members/significant others, and if so, who? No  Plans to Return to Present Housing: Yes  Other

## 2024-09-25 NOTE — PROGRESS NOTES
A&Ox4. Ambulatory. Denied  chest pain/heaviness, /SOB. With intermittent cramping on lower part of the abdomen. Instructed on NPO after midnight.   SR x 2.   Call light within reach.   Bed alarm on. Bed on lowest position.

## 2024-09-25 NOTE — H&P
Hospital Medicine History & Physical      PCP: No primary care provider on file.    Date of Admission: 2024    Date of Service: Pt seen/examined on 2024    Pt seen/examined face to face on and admitted as inpatient with expected LOS to be two days but can change depending on diagnostic work up and treatment response.     Chief Complaint:    Chief Complaint   Patient presents with    Miscarriage     Pt sent by her OBGYN via 911 for vaginal bleeding and syncope. EMS reporting large amounts of blood loss. Pt believes she's approximately eight weeks pregnant. States she's never been pregnant before. Says she didn't think she could get pregnant.             ASSESSMENT AND PLAN:    Active Hospital Problems    Diagnosis Date Noted    Vaginal bleeding [N93.9] 2024     Acute vaginal bleed secondary to miscarriage  OB/GYN was consulted in ED requested medicine to admit  H&H every 6 hours  Patient is Rh- thus was given RhoGAM given her partner is Rh positive  OB/GYN consulted, appreciated    Syncope:  EKG normal sinus rhythm  Orthostatics pending  Continue to monitor    Acute blood loss anemia secondary to vaginal bleed    Macrocytosis: B12 folate      .Due to the above diagnosis makes the patient higher risk for morbidity and mortality requiring testing and treatment      Discussion with the primary ER physician in regards to symptoms, history, physical exam, diagnosis and treatment, collaborative decision was to admit the patient.    Diet: NPO except meds ordered    DVT Prophylaxis: Held  Dispo:   Expected LOS of two days      History Of Present Illness:      44 y.o. female  who presented to [facility] presented to the ED with chief complaint of vaginal bleeding and syncope    Patient reported she has had a positive pregnancy test back in August reported that she has never been pregnant before.  Patient reported that she works at [employer] has been having a lot of exertional work at home reported          EKG:  I have reviewed the EKG with the following interpretation:   Normal sinus rhythm    .  US PELVIS LIMITED   Preliminary Result   1. Recent . Small fluid collection in the uterus measures 0.9 x 1.4 x   0.9 cm.  No evidence of retained products.   2. Right ovary not visualized.   3. Left ovary within normal limits.                      Eulalio Hawk, DO

## 2024-09-25 NOTE — PROGRESS NOTES
Pt A/O x 4, VSS. Pt passed small clot this morning and reports minimal bleeding throughout the day. Pt reports mild cramping but otherwise no pain. Pt ambulating to RR and tolerating well with no complaints of dizziness.  bedside today. Bed locked and in lowest position. Bedside table and call light within reach. Non-skid footwear in place. Pt voices no other needs at this time. All care per orders. Care continuing.

## 2024-09-25 NOTE — ED NOTES
Ana Mark is a 44 y.o. female admitted for  Principal Problem:    Vaginal bleeding  Resolved Problems:    * No resolved hospital problems. *  .   Patient Home via family with   Chief Complaint   Patient presents with    Miscarriage     Pt sent by her OBGYN via 911 for vaginal bleeding and syncope. EMS reporting large amounts of blood loss. Pt believes she's approximately eight weeks pregnant. States she's never been pregnant before. Says she didn't think she could get pregnant.    .  Patient is alert and Person, Place, Time, and Situation  Patient's baseline mobility: Baseline Mobility: Independent   Code Status: No Order   Cardiac Rhythm:       Is patient on baseline Oxygen: no how many Liters:   Abnormal Assessment Findings: see results    Isolation:       NIH Score:    C-SSRS: Risk of Suicide: No Risk  Bedside swallow:        Active LDA's:    Patient admitted with a moore:  If the moore is chronic was it exchanged:  Reason for moore:   Patient admitted with Central Line:  . PICC line placement confirmed: YES OR NO:[id number]}   Reason for Central line:  Was central line Inserted from an outside facility:        Family/Caregiver Present yes Any Concerns: no   Restraints no  Sitter no         Vitals: MEWS Score: 1    Vitals:    09/24/24 1841 09/24/24 1906 09/24/24 1936 09/24/24 2106   BP: 99/62 (!) 86/53 (!) 106/59 96/66   Pulse: 80 80 78 93   Resp: 15 21 16 17   Temp:       TempSrc:       SpO2: 98% 95% 100% 98%   Weight:           Last documented pain score (0-10 scale) Pain Level: 0  Pain medication administered see results.    Pertinent or High Risk Medications/Drips: No.    Pending Blood Product Administration: no    Abnormal labs:   Abnormal Labs Reviewed   CBC WITH AUTO DIFFERENTIAL - Abnormal; Notable for the following components:       Result Value    WBC 17.7 (*)     RBC 3.60 (*)     .4 (*)     Neutrophils Absolute 14.3 (*)     All other components within normal limits   COMPREHENSIVE  METABOLIC PANEL W/ REFLEX TO MG FOR LOW K - Abnormal; Notable for the following components:    Glucose 154 (*)     Total Protein 6.0 (*)     ALT 8 (*)     AST 13 (*)     All other components within normal limits   URINALYSIS WITH REFLEX TO CULTURE - Abnormal; Notable for the following components:    Color, UA RED (*)     Clarity, UA CLOUDY (*)     Ketones, Urine 15 (*)     Blood, Urine LARGE (*)     Protein,  (*)     Nitrite, Urine POSITIVE (*)     Leukocyte Esterase, Urine MODERATE (*)     All other components within normal limits   MICROSCOPIC URINALYSIS - Abnormal; Notable for the following components:    RBC, UA >100 (*)     All other components within normal limits   HEMOGLOBIN AND HEMATOCRIT - Abnormal; Notable for the following components:    Hemoglobin 11.0 (*)     Hematocrit 33.3 (*)     All other components within normal limits     Critical values: see results  Intervention for critical value(s):     Abnormal Imaging: see results             You may also review the ED PT Care Timeline found under the Summary Tab, ED Encounter Summary, Timeline Reports, ED Patient Care Timeline.     Recommendation    Pending orders/Uncompleted orders to hand off:      Additional Comments:   If any further questions, please call Sending RN at [phone]

## 2024-09-25 NOTE — CONSULTS
Gynecology Consultation Note    Ana Mark  [id number]    History    CC: Vaginal bleeding    HPI: Ana Mark is a 44 y.o.   who presented to ED yesterday evening for possible miscarriage with vaginal bleeding and syncope. Patient   Sees Dr Huynh at [facility] and was asked to come to ED for vaginal bleeding that started around 2 pm yesterday. She was admitted overnight to the floor with hospitalist group.   She now reports minimal bleeding, denies dizziness or syncope. She denies abdominal pain.     PMH:  History reviewed. No pertinent past medical history.    PSH:  History reviewed. No pertinent surgical history.    OB History    Para Term  AB Living   2             SAB IAB Ectopic Molar Multiple Live Births                    # Outcome Date GA Lbr [place]/2nd Weight Sex Type Anes PTL Lv   2 Current            1                   Medications:  No current facility-administered medications on file prior to encounter.     Current Outpatient Medications on File Prior to Encounter   Medication Sig Dispense Refill    Loratadine-Pseudoephedrine (CLARITIN-D 24 HOUR PO) Take 1 tablet by mouth daily         Allergies:  Patient has no known allergies.    SH:  Social History     Socioeconomic History    Marital status: Single     Spouse name: Not on file    Number of children: Not on file    Years of education: Not on file    Highest education level: Not on file   Occupational History    Not on file   Tobacco Use    Smoking status: Former     Current packs/day: 0.50     Types: Cigarettes    Smokeless tobacco: Never   Substance and Sexual Activity    Alcohol use: No    Drug use: No    Sexual activity: Not Currently   Other Topics Concern    Not on file   Social History Narrative    Not on file     Social Determinants of Health     Financial Resource Strain: Not on file   Food Insecurity: No Food Insecurity (2024)    Hunger Vital Sign      abnormally low    Imaging Studies      .lastimg  US PELVIS LIMITED  Narrative: EXAMINATION:  PELVIC ULTRASOUND    2024    TECHNIQUE:  Transabdominal pelvic ultrasound was performed.  Duplex evaluation was done  through direct visualization, color flow Doppler and spectral analysis.    COMPARISON:  None    HISTORY:  ORDERING SYSTEM PROVIDED HISTORY: 1st trimester pregnancy - may have passed a  pregnancy. She states had an US at OB weeks ago. Unsure of findings. I am  calling them now.  TECHNOLOGIST PROVIDED HISTORY:    Reason for exam:->1st trimester pregnancy - may have passed a pregnancy. She  states had an US at OB weeks ago. Unsure of findings. I am calling them now.    FINDINGS:    Measurements:    Uterus: 9.1 x 4.9 x 4.4 cm    No intrauterine pregnancy.  Patient had a recent .  Small fluid  collection in the uterus measures 0.9 x 1.4 x 0.9 cm.    Right Ovary:Not measured.    Left Ovary: 2.7 x 1.2 x 1.4 cm.  Normal vascularity.    Ultrasound Findings:    Uterus: Uterus demonstrates normal myometrial echotexture.    Endometrial stripe: Recent .  Fluid collection measures 0.9 x 1.4 x  0.9 cm.    Right Ovary: Not visualized.    Left Ovary:  Left ovary is within normal limits. Normal vascularity.    Free Fluid: No evidence of free fluid.  Impression: 1. Recent . Small fluid collection in the uterus measures 0.9 x 1.4 x  0.9 cm.  No evidence of retained products.  2. Right ovary not visualized.  3. Left ovary within normal limits.       Assessment and Plan  Ana Mark is a 44 y.o. female s/p complete  with blood loss related mild anemia    - CBC stable on last three checks  - No heavy vaginal bleeding and patient feels well enough to be discharged  - US c/w complete   - Rh neg s/p rhogam in the ED  - discussed findings with patient, OK to be discharged today. Follow up with OB at [facility] [place] in 2 wks    Cynthia hTompson MD  [facility]

## 2024-09-25 NOTE — PROGRESS NOTES
Pt a/o x4. VSS. All prescriptions, discharge and follow up instructions given to the patient.  The patient verbalizes understanding and denies questions.  All belongings collected and sent with the patient. The patient ambulated off unit with  in stable condition.

## 2024-09-25 NOTE — CONSULTS
Consult Placed   Consult re-Called  Who: Dr. SAMI Thompson  Date: 9/25/2024  Time: 11:03 AM       Electronically signed by Tracy Hodge on 9/25/2024 at 11:03 AM